# Patient Record
Sex: MALE | Race: WHITE
[De-identification: names, ages, dates, MRNs, and addresses within clinical notes are randomized per-mention and may not be internally consistent; named-entity substitution may affect disease eponyms.]

---

## 2018-04-10 ENCOUNTER — HOSPITAL ENCOUNTER (EMERGENCY)
Dept: HOSPITAL 59 - ER | Age: 33
Discharge: HOME | End: 2018-04-10
Payer: COMMERCIAL

## 2018-04-10 DIAGNOSIS — M77.52: Primary | ICD-10-CM

## 2018-04-10 DIAGNOSIS — F17.210: ICD-10-CM

## 2018-04-10 PROCEDURE — 99283 EMERGENCY DEPT VISIT LOW MDM: CPT

## 2018-04-10 NOTE — EMERGENCY DEPARTMENT RECORD
History of Present Illness





- General


Chief complaint: Lower Extremity Pain


Stated complaint: KNOT ON LT ANKLE/PAINFUL


Time Seen by Provider: 04/10/18 21:13


Source: Patient


Mode of Arrival: Ambulatory


Limitations: No limitations





- History of Present Illness


Initial comments: 





33 year old male presents with lateral left ankle pain today.  He noted a small 

swollen area.  No specific injury.  No warmth, redness.  Skin is intact.  No 

achilles pain.  No history of joint or orthopedic issues of this joint. 


Onset/Timing: 3


-: Hour(s)


Location: Left, Ankle, Foot


-: Yes Arthralgia


Severity scale (1-10): 5


Improves with: Immobilization, Rest


Worsens with: Weight bearing


Associated Symptoms: Denies other symptoms





- Related Data


 Allergies











Allergy/AdvReac Type Severity Reaction Status Date / Time


 


Penicillins Allergy  PT UNSURE Verified 04/10/18 20:58





   OF REACTION  














Travel Screening





- Travel/Exposure Within Last 30 Days


Have you traveled within the last 30 days?: No





- Travel/Exposure Within Last Year


Have you traveled outside the [place] in the last year?: No





- Additonal Travel Details


Have you been exposed to anyone with a communicable illness?: No





- Travel Symptoms


Symptom Screening: None





Review of Systems


Constitutional: Denies: Chills, Fever, Malaise


Eyes: Denies: Eye pain, Vision change


ENT: Denies: Congestion, Ear pain, Throat pain


Respiratory: Denies: Cough


Cardiovascular: Denies: Chest pain, Syncope


Endocrine: Denies: Fatigue


Gastrointestinal: Denies: Abdominal pain, Diarrhea, Nausea, Vomiting


Genitourinary: Denies: Dysuria, Frequency


Musculoskeletal: Reports: As per HPI, Arthralgia


Skin: Denies: Bruising, Change in color, Pruritus, Rash


Neurological: Denies: Confusion, Headache


Psychiatric: Denies: Anxiety


Hematological/Lymphatic: Denies: Easy bleeding, Easy bruising, Swollen glands





Past Medical History





- SOCIAL HISTORY


Smoking Status: Current every day smoker


Alcohol Use: Occasional


Alcohol Use Comment: BEER


Drug Use: None





- RESPIRATORY


Hx Respiratory Disorders: No





- CARDIOVASCULAR


Hx Cardio Disorders: No





- NEURO


Hx Neuro Disorders: No





- GI


Hx GI Disorders: No





- 


Hx Genitourinary Disorders: No





- ENDOCRINE


Hx Endocrine Disorders: No





- MUSCULOSKELETAL


Hx Musculoskeletal Disorders: No





- PSYCH


Hx Psych Problems: No





- HEMATOLOGY/ONCOLOGY


Hx Hematology/Oncology Disorders: No





Family Medical History


Any Significant Family History?: No





Physical Exam





- General


General Appearance: Alert, Oriented x3, Cooperative, No acute distress


Limitations: No limitations





- Head


Head exam: Atraumatic, Normal inspection





- Eye


Eye exam: Normal appearance





- ENT


ENT exam: Normal exam


Ear exam: Normal external inspection


Nasal Exam: Normal inspection


Mouth exam: Normal external inspection





- Neck


Neck exam: Normal inspection





- Cardiovascular


Peripheral Pulses: 2+: Dorsalis Pedis (L)





- Extremities


Extremities exam: Normal inspection, Normal capillary refill


Image of Feet: 


  __________________________














  __________________________





 1 - just distal and anterior to the tip of the lateral malleolus there is 

slight STS ,no warmth or redness, intact skin.  full ROM








- Neurological


Neurological exam: Alert, Oriented X3





- Psychiatric


Psychiatric exam: Normal affect, Normal mood





- Skin


Skin exam: Dry, Intact, Normal color, Warm.  negative: Cyanosis, Diaphoretic, 

Erythema, Mottled, Pallor, Petechiae, Rash, Urticaria, Vesicles





Course





 Vital Signs











  04/10/18





  20:54


 


Temperature 97.9 F


 


Pulse Rate 88


 


Respiratory 20





Rate 


 


Blood Pressure 134/84


 


Pulse Ox 99














- Reevaluation(s)


Reevaluation #1: 





04/10/18 21:58


XR was negative for any acute fredo abnormality.  Lateral STS.


04/11/18 00:27


Possible tendonitis vs bursitis





Disposition


Disposition: Discharge


Clinical Impression: 


 Left ankle tendonitis





Disposition: Home, Self-Care


Condition: (1) Good


Instructions:  Tendinitis (ED)


Additional Instructions: 


Ice the tender swollen area 3 times daily


Try to limit walking and standing


Take motrin as needed for pain


If worse you may need referral to your orthopedic doctor for evaluation


Forms:  Patient Portal Access


Time of Disposition: 22:00





Quality





- Quality Measures


Quality Measures: N/A





- Blood Pressure Screening


Does Patient Have Any of the Following: No


Blood Pressure Classification: Pre-Hypertensive BP Reading


Systolic Measurement: 134


Diastolic Measurement: 84


Screening for High Blood Pressure: < Pre-Hypertensive BP, F/U Documented > [

]


Pre-Hypertensive Follow-up Interventions: Referral to alternative/primary care 

provider.

## 2018-04-12 NOTE — RADIOLOGY REPORT
EXAM:  LEFT ANKLE, THREE VIEWS



HISTORY:  PAIN AND SWELLING.  



TECHNIQUE:  Three views of the left ankle were obtained.  



Comparison:  None.  



FINDINGS:  Tiny well corticated fragment inferior to the medial malleolus.  No 
acute fractures are identified.  There is mild diffuse soft tissue swelling.  



IMPRESSION:  

NO EVIDENCE OF ACUTE FRACTURE OF THE LEFT ANKLE.  SOFT TISSUE SWELLING IS 
PRESENT.  



JOB NUMBER:  [id number]
MTDD